# Patient Record
Sex: FEMALE | Race: WHITE
[De-identification: names, ages, dates, MRNs, and addresses within clinical notes are randomized per-mention and may not be internally consistent; named-entity substitution may affect disease eponyms.]

---
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
## 2020-02-11 PROBLEM — Z00.00 ENCOUNTER FOR PREVENTIVE HEALTH EXAMINATION: Status: ACTIVE | Noted: 2020-02-11

## 2020-02-12 ENCOUNTER — LABORATORY RESULT (OUTPATIENT)
Age: 20
End: 2020-02-12

## 2020-02-12 ENCOUNTER — APPOINTMENT (OUTPATIENT)
Dept: OBGYN | Facility: CLINIC | Age: 20
End: 2020-02-12
Payer: COMMERCIAL

## 2020-02-12 VITALS
BODY MASS INDEX: 22.84 KG/M2 | SYSTOLIC BLOOD PRESSURE: 110 MMHG | DIASTOLIC BLOOD PRESSURE: 70 MMHG | HEIGHT: 61 IN | WEIGHT: 121 LBS

## 2020-02-12 DIAGNOSIS — Z23 ENCOUNTER FOR IMMUNIZATION: ICD-10-CM

## 2020-02-12 DIAGNOSIS — Z01.419 ENCOUNTER FOR GYNECOLOGICAL EXAMINATION (GENERAL) (ROUTINE) W/OUT ABNORMAL FINDINGS: ICD-10-CM

## 2020-02-12 DIAGNOSIS — Z30.42 ENCOUNTER FOR SURVEILLANCE OF INJECTABLE CONTRACEPTIVE: ICD-10-CM

## 2020-02-12 DIAGNOSIS — Z78.9 OTHER SPECIFIED HEALTH STATUS: ICD-10-CM

## 2020-02-12 PROCEDURE — 99385 PREV VISIT NEW AGE 18-39: CPT

## 2020-02-12 RX ORDER — HUMAN PAPILLOMAVIRUS 9-VALENT VACCINE, RECOMBINANT 30; 40; 60; 40; 20; 20; 20; 20; 20 UG/.5ML; UG/.5ML; UG/.5ML; UG/.5ML; UG/.5ML; UG/.5ML; UG/.5ML; UG/.5ML; UG/.5ML
INJECTION, SUSPENSION INTRAMUSCULAR
Qty: 1 | Refills: 2 | Status: ACTIVE | COMMUNITY
Start: 2020-02-12 | End: 1900-01-01

## 2020-02-12 NOTE — COUNSELING
[STD (testing, results, tx)] : STD (testing, results, tx) [Menstrual Calendar] : menstrual calendar [Contraception] : contraception [Vaccines] : vaccines [Safe Sexual Practices] : safe sexual practices [Family Involvement] : family involvement

## 2020-02-12 NOTE — PHYSICAL EXAM
[Normal] : uterus [Labia Majora] : labia major [Labia Minora] : labia minora [Scant] : scant [Thin] : thin [White] : white [No Bleeding] : there was no active vaginal bleeding [Uterine Adnexae] : were not tender and not enlarged [Discharge] : had no discharge [Motion Tenderness] : there was no cervical motion tenderness [Tenderness] : nontender [Enlarged ___ wks] : not enlarged [Adnexa Tenderness] : were not tender [Ovarian Mass (___ Cm)] : there were no adnexal masses

## 2020-02-12 NOTE — DISCUSSION/SUMMARY
[FreeTextEntry1] : Discussed pelvic exam findings of white vaginal discharge. BV swab sent. Counseled pt for full STD testing which was declined but willing to send GC given discharge. Ucx sent.\par \par Has not received HPV vaccine in past. Counseled for risks benefits alternatives and pt desiring to start series. Rx sent and instructions given to make RN visit for administration of HPV vaccine and Depo inj. \par \par Emphasized importance of condom use with every sexual encounter for prevention of STIs and pregnancy. Counseled on contraceptive options including daily OCPs, depo inj, patch, ring, LARCs i.e. nexplanon maury, copper IUD, progesterone IUD and effect each may have on menstrual flow. Pt desires Depo q3mon injection. Instructions given and Rx sent to pharmacy. RTC 3 months.

## 2020-02-18 LAB — BACTERIA UR CULT: NORMAL

## 2020-02-20 ENCOUNTER — APPOINTMENT (OUTPATIENT)
Dept: OBGYN | Facility: CLINIC | Age: 20
End: 2020-02-20
Payer: COMMERCIAL

## 2020-02-20 PROCEDURE — 90471 IMMUNIZATION ADMIN: CPT

## 2020-02-20 PROCEDURE — 90649 4VHPV VACCINE 3 DOSE IM: CPT

## 2020-02-21 ENCOUNTER — APPOINTMENT (OUTPATIENT)
Dept: OBGYN | Facility: CLINIC | Age: 20
End: 2020-02-21
Payer: COMMERCIAL

## 2020-02-21 PROCEDURE — 96372 THER/PROPH/DIAG INJ SC/IM: CPT

## 2020-02-21 RX ADMIN — MEDROXYPROGESTERONE ACETATE 0 MG/0.65ML: 104 INJECTION, SUSPENSION SUBCUTANEOUS at 00:00

## 2020-03-04 RX ORDER — MEDROXYPROGESTERONE ACETATE 104 MG/.65ML
104 INJECTION, SUSPENSION SUBCUTANEOUS
Qty: 0 | Refills: 0 | Status: COMPLETED | OUTPATIENT
Start: 2020-02-21

## 2020-03-11 ENCOUNTER — APPOINTMENT (OUTPATIENT)
Dept: OBGYN | Facility: CLINIC | Age: 20
End: 2020-03-11
Payer: COMMERCIAL

## 2020-03-11 VITALS
SYSTOLIC BLOOD PRESSURE: 110 MMHG | WEIGHT: 124 LBS | BODY MASS INDEX: 23.41 KG/M2 | HEIGHT: 61 IN | DIASTOLIC BLOOD PRESSURE: 80 MMHG

## 2020-03-11 DIAGNOSIS — N76.0 ACUTE VAGINITIS: ICD-10-CM

## 2020-03-11 DIAGNOSIS — B96.89 ACUTE VAGINITIS: ICD-10-CM

## 2020-03-11 DIAGNOSIS — Z11.3 ENCOUNTER FOR SCREENING FOR INFECTIONS WITH A PREDOMINANTLY SEXUAL MODE OF TRANSMISSION: ICD-10-CM

## 2020-03-11 PROCEDURE — 99211 OFF/OP EST MAY X REQ PHY/QHP: CPT | Mod: NC

## 2020-03-11 NOTE — PHYSICAL EXAM
[Normal] : uterus [Labia Majora] : labia major [Labia Minora] : labia minora [Scant] : there was scant vaginal bleeding [Discharge] : had no discharge [Motion Tenderness] : there was no cervical motion tenderness [Tenderness] : nontender [Enlarged ___ wks] : not enlarged [Uterine Adnexae] : were not tender and not enlarged [Adnexa Tenderness] : were not tender [Ovarian Mass (___ Cm)] : there were no adnexal masses

## 2020-03-12 LAB
C TRACH RRNA SPEC QL NAA+PROBE: NOT DETECTED
N GONORRHOEA RRNA SPEC QL NAA+PROBE: NOT DETECTED
SOURCE AMPLIFICATION: NORMAL

## 2020-03-12 RX ORDER — METRONIDAZOLE 7.5 MG/G
0.75 GEL VAGINAL
Qty: 1 | Refills: 0 | Status: ACTIVE | COMMUNITY
Start: 2020-03-12 | End: 1900-01-01

## 2020-03-13 LAB
CANDIDA VAG CYTO: NOT DETECTED
G VAGINALIS+PREV SP MTYP VAG QL MICRO: DETECTED
T VAGINALIS VAG QL WET PREP: NOT DETECTED

## 2020-05-13 ENCOUNTER — APPOINTMENT (OUTPATIENT)
Dept: OBGYN | Facility: CLINIC | Age: 20
End: 2020-05-13

## 2020-05-20 RX ORDER — MEDROXYPROGESTERONE ACETATE 104 MG/.65ML
104 INJECTION, SUSPENSION SUBCUTANEOUS
Qty: 1 | Refills: 3 | Status: ACTIVE | COMMUNITY
Start: 2020-02-12

## 2020-05-20 RX ORDER — HUMAN PAPILLOMAVIRUS 9-VALENT VACCINE, RECOMBINANT 30; 40; 60; 40; 20; 20; 20; 20; 20 UG/.5ML; UG/.5ML; UG/.5ML; UG/.5ML; UG/.5ML; UG/.5ML; UG/.5ML; UG/.5ML; UG/.5ML
INJECTION, SUSPENSION INTRAMUSCULAR
Qty: 1 | Refills: 1 | Status: ACTIVE | COMMUNITY
Start: 2020-05-11

## 2020-05-20 RX ORDER — HUMAN PAPILLOMAVIRUS 9-VALENT VACCINE, RECOMBINANT 30; 40; 60; 40; 20; 20; 20; 20; 20 UG/.5ML; UG/.5ML; UG/.5ML; UG/.5ML; UG/.5ML; UG/.5ML; UG/.5ML; UG/.5ML; UG/.5ML
INJECTION, SUSPENSION INTRAMUSCULAR
Qty: 1 | Refills: 1 | Status: ACTIVE | COMMUNITY
Start: 2020-05-19

## 2020-06-26 RX ORDER — METRONIDAZOLE 7.5 MG/G
0.75 GEL VAGINAL
Qty: 5 | Refills: 0 | Status: ACTIVE | COMMUNITY
Start: 2020-06-25

## 2020-07-06 ENCOUNTER — APPOINTMENT (OUTPATIENT)
Dept: OBGYN | Facility: CLINIC | Age: 20
End: 2020-07-06

## 2020-07-29 ENCOUNTER — APPOINTMENT (OUTPATIENT)
Dept: PLASTIC SURGERY | Facility: CLINIC | Age: 20
End: 2020-07-29
Payer: COMMERCIAL

## 2020-07-29 VITALS
HEIGHT: 61 IN | WEIGHT: 130 LBS | SYSTOLIC BLOOD PRESSURE: 106 MMHG | BODY MASS INDEX: 24.55 KG/M2 | DIASTOLIC BLOOD PRESSURE: 67 MMHG | HEART RATE: 70 BPM | OXYGEN SATURATION: 98 %

## 2020-07-29 PROCEDURE — 99202 OFFICE O/P NEW SF 15 MIN: CPT

## 2020-07-29 NOTE — ASSESSMENT
[FreeTextEntry1] : A:\par Keloid scar right ear\par P:\par Discussed at length with patient.  May come to surgical excision but would try Kenalog injections first.\par Patient agreed and tolerated 10 mg injection

## 2020-07-29 NOTE — HISTORY OF PRESENT ILLNESS
[FreeTextEntry1] : Patient placed an upper right helical ear ring in about three months ago and took it out shortly after due to discomfort.  She subsequently noted a growth at the site which ahs steadily increased in size.  She presents now for evaluation and management \par she is otherwise in good health

## 2020-07-29 NOTE — PHYSICAL EXAM
[NI] : Normal [de-identified] : 1 cm riased scar posterior upper third helical rim on the right side\par No surrounding erythema or purulence\par No palpable adenopathy

## 2020-07-29 NOTE — REASON FOR VISIT
[Consultation] : a consultation visit [FreeTextEntry1] : Patient presents for treatment of a keloid scar on the right ear

## 2020-08-26 ENCOUNTER — APPOINTMENT (OUTPATIENT)
Dept: PLASTIC SURGERY | Facility: CLINIC | Age: 20
End: 2020-08-26
Payer: COMMERCIAL

## 2020-08-26 VITALS
OXYGEN SATURATION: 99 % | HEART RATE: 81 BPM | RESPIRATION RATE: 16 BRPM | TEMPERATURE: 98.4 F | SYSTOLIC BLOOD PRESSURE: 99 MMHG | DIASTOLIC BLOOD PRESSURE: 66 MMHG

## 2020-08-26 PROCEDURE — 99212 OFFICE O/P EST SF 10 MIN: CPT

## 2020-08-26 NOTE — ASSESSMENT
[FreeTextEntry1] : A:\par Keloid scar right ear\par P:\par Following a sterile prep, 10 mg Kenalog injected\par Patient tolerated well\par Instructions reviewed

## 2020-08-26 NOTE — PHYSICAL EXAM
[de-identified] : Keloid right posterior ear \par softer and slightly smaller\par no color change noted

## 2020-08-26 NOTE — REASON FOR VISIT
[Follow-Up: _____] : a [unfilled] follow-up visit [FreeTextEntry1] : Patient notes softening of the right posterior ear keloid following Kenalog injection

## 2020-10-07 ENCOUNTER — APPOINTMENT (OUTPATIENT)
Dept: PLASTIC SURGERY | Facility: CLINIC | Age: 20
End: 2020-10-07
Payer: COMMERCIAL

## 2020-10-07 VITALS
SYSTOLIC BLOOD PRESSURE: 99 MMHG | TEMPERATURE: 97.4 F | DIASTOLIC BLOOD PRESSURE: 65 MMHG | OXYGEN SATURATION: 99 % | HEART RATE: 76 BPM

## 2020-10-07 DIAGNOSIS — L91.0 HYPERTROPHIC SCAR: ICD-10-CM

## 2020-10-07 PROCEDURE — 99212 OFFICE O/P EST SF 10 MIN: CPT

## 2020-10-07 NOTE — REASON FOR VISIT
[Follow-Up: _____] : a [unfilled] follow-up visit [FreeTextEntry1] : Ms. DEMETRIO BRINK returns for a follow up visit, generally doing well with no complaints and no fevers or chills at home.  improvement noted with two prior Kenalog injections

## 2020-10-07 NOTE — PHYSICAL EXAM
[de-identified] : right post auricular keloid softer and decreased in size\par shape of the underlying ear unchanged

## 2020-10-07 NOTE — ASSESSMENT
[FreeTextEntry1] : A:\par Keloid scar right ear\par P:\par Kenalog 5 mg injection to keloid\par tolerated well\par instructions reviewed

## 2020-12-23 PROBLEM — N76.0 BACTERIAL VAGINOSIS: Status: RESOLVED | Noted: 2020-03-11 | Resolved: 2020-12-23

## 2020-12-23 PROBLEM — Z01.419 ENCOUNTER FOR ANNUAL ROUTINE GYNECOLOGICAL EXAMINATION: Status: RESOLVED | Noted: 2020-02-12 | Resolved: 2020-12-23

## 2020-12-23 PROBLEM — N76.0 ACUTE VAGINITIS: Status: RESOLVED | Noted: 2020-03-11 | Resolved: 2020-12-23

## 2021-01-06 ENCOUNTER — APPOINTMENT (OUTPATIENT)
Dept: PLASTIC SURGERY | Facility: CLINIC | Age: 21
End: 2021-01-06